# Patient Record
Sex: FEMALE | Race: WHITE | NOT HISPANIC OR LATINO | Employment: UNEMPLOYED | URBAN - METROPOLITAN AREA
[De-identification: names, ages, dates, MRNs, and addresses within clinical notes are randomized per-mention and may not be internally consistent; named-entity substitution may affect disease eponyms.]

---

## 2020-07-21 ENCOUNTER — OFFICE VISIT (OUTPATIENT)
Dept: OBSTETRICS AND GYNECOLOGY | Facility: CLINIC | Age: 35
End: 2020-07-21
Payer: MEDICAID

## 2020-07-21 VITALS
HEIGHT: 65 IN | BODY MASS INDEX: 48.82 KG/M2 | SYSTOLIC BLOOD PRESSURE: 137 MMHG | DIASTOLIC BLOOD PRESSURE: 82 MMHG | WEIGHT: 293 LBS | HEART RATE: 93 BPM

## 2020-07-21 DIAGNOSIS — E66.01 MORBID OBESITY: ICD-10-CM

## 2020-07-21 DIAGNOSIS — Z01.419 ENCOUNTER FOR GYNECOLOGICAL EXAMINATION WITHOUT ABNORMAL FINDING: Primary | ICD-10-CM

## 2020-07-21 DIAGNOSIS — N92.1 MENOMETRORRHAGIA: ICD-10-CM

## 2020-07-21 PROCEDURE — 99385 PREV VISIT NEW AGE 18-39: CPT | Mod: S$GLB,,, | Performed by: OBSTETRICS & GYNECOLOGY

## 2020-07-21 PROCEDURE — 99385 PR PREVENTIVE VISIT,NEW,18-39: ICD-10-PCS | Mod: S$GLB,,, | Performed by: OBSTETRICS & GYNECOLOGY

## 2020-07-21 RX ORDER — NORGESTIMATE AND ETHINYL ESTRADIOL 0.25-0.035
1 KIT ORAL DAILY
Qty: 90 TABLET | Refills: 3 | Status: SHIPPED | OUTPATIENT
Start: 2020-07-21 | End: 2021-01-19

## 2020-07-21 RX ORDER — CETIRIZINE HYDROCHLORIDE 10 MG/1
TABLET ORAL
COMMUNITY

## 2020-07-21 RX ORDER — METOPROLOL TARTRATE 50 MG/1
TABLET ORAL
COMMUNITY

## 2020-07-21 RX ORDER — GABAPENTIN 100 MG/1
CAPSULE ORAL
COMMUNITY
Start: 2019-06-03 | End: 2020-12-21 | Stop reason: CLARIF

## 2020-07-21 NOTE — PROGRESS NOTES
"  Subjective:       Patient ID: Cleo Bro is a 34 y.o. female.    Chief Complaint:  Annual Exam      History of Present Illness  Pt here for gyn annual.  History and past labs reviewed with patient.    Complaints irreg vag bleeding.  No family history of breast cancer.  Patient is a PCP.  She has not been to a gynecologist in 7 years.  She has no other complaints today.      Review of Systems  Review of Systems   Constitutional: Negative for chills and fever.   Respiratory: Negative for shortness of breath.    Cardiovascular: Negative for chest pain.   Gastrointestinal: Negative for abdominal pain, blood in stool, constipation, diarrhea, nausea, vomiting and reflux.   Genitourinary: Negative for dysmenorrhea, dyspareunia, dysuria, hematuria, hot flashes, menorrhagia, menstrual problem, pelvic pain, vaginal bleeding, vaginal discharge, postcoital bleeding and vaginal dryness.   Musculoskeletal: Negative for arthralgias and joint swelling.   Integumentary:  Negative for rash, hair changes, breast mass, nipple discharge and breast skin changes.   Psychiatric/Behavioral: Negative for depression. The patient is not nervous/anxious.    Breast: Negative for asymmetry, lump, mass, nipple discharge and skin changes          Objective:     Vitals:    07/21/20 1016   BP: 137/82   Pulse: 93   Weight: (!) 155.1 kg (342 lb)   Height: 5' 5" (1.651 m)       Physical Exam:   Constitutional: She appears well-developed and well-nourished. No distress.    HENT:   Head: Normocephalic and atraumatic.    Eyes: Conjunctivae and EOM are normal.    Neck: No tracheal deviation present. No thyromegaly present.    Cardiovascular: Exam reveals no clubbing, no cyanosis and no edema.     Pulmonary/Chest: Effort normal. No respiratory distress.        Abdominal: Soft. She exhibits no distension and no mass. There is no abdominal tenderness. There is no rebound and no guarding. No hernia.     Genitourinary:    Vagina, uterus and rectum normal. "      Pelvic exam was performed with patient supine.   There is no rash, tenderness, lesion or injury on the right labia. There is no rash, tenderness, lesion or injury on the left labia. Cervix is normal. Right adnexum displays no mass, no tenderness and no fullness. Left adnexum displays no mass, no tenderness and no fullness.                Skin: She is not diaphoretic. No cyanosis. Nails show no clubbing.        breast exam wnl- no nipple dc, skin changes, masses or lymph nodes palpated bilaterally    Assessment:        1. Encounter for gynecological examination without abnormal finding    2. Morbid obesity               Plan:      Annual   Pap today   Risk assessment for inherited gyn cancer done   RTC  1 year     ocps

## 2020-12-14 ENCOUNTER — TELEPHONE (OUTPATIENT)
Dept: ORTHOPEDICS | Facility: CLINIC | Age: 35
End: 2020-12-14

## 2020-12-14 NOTE — TELEPHONE ENCOUNTER
----- Message from Nilay Gunn sent at 12/14/2020 10:56 AM CST -----  Pt is being referred for a wrist fracture. Deanna scanned the referral/records into media mgr within [employer]. Please review and contact pt for scheduling,thanks

## 2020-12-14 NOTE — TELEPHONE ENCOUNTER
12/14/20  2:15pm  Spoke w/ referring doctor's office.    Was told to disregard referral.  Was sent to wrong office.

## 2020-12-21 ENCOUNTER — HOSPITAL ENCOUNTER (EMERGENCY)
Facility: HOSPITAL | Age: 35
Discharge: HOME | End: 2020-12-21
Attending: EMERGENCY MEDICINE
Payer: MEDICAID

## 2020-12-21 VITALS
HEIGHT: 64 IN | TEMPERATURE: 98 F | DIASTOLIC BLOOD PRESSURE: 83 MMHG | BODY MASS INDEX: 50.02 KG/M2 | RESPIRATION RATE: 12 BRPM | WEIGHT: 293 LBS | SYSTOLIC BLOOD PRESSURE: 144 MMHG | OXYGEN SATURATION: 95 % | HEART RATE: 82 BPM

## 2020-12-21 DIAGNOSIS — S52.691A OTHER CLOSED FRACTURE OF DISTAL END OF RIGHT ULNA, INITIAL ENCOUNTER: ICD-10-CM

## 2020-12-21 DIAGNOSIS — S52.501A CLOSED FRACTURE OF DISTAL END OF RIGHT RADIUS, UNSPECIFIED FRACTURE MORPHOLOGY, INITIAL ENCOUNTER: ICD-10-CM
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
## 2020-12-21 DIAGNOSIS — Z48.02 VISIT FOR SUTURE REMOVAL: Primary | ICD-10-CM

## 2020-12-21 LAB
BUN SERPL-MCNC: 7 MG/DL (ref 6–30)
CHLORIDE SERPL-SCNC: 101 MMOL/L (ref 95–110)
CREAT SERPL-MCNC: 0.4 MG/DL (ref 0.5–1.4)
GLUCOSE SERPL-MCNC: 138 MG/DL (ref 70–110)
HCT VFR BLD CALC: 31 %PCV (ref 36–54)
POC IONIZED CALCIUM: 1.07 MMOL/L (ref 1.06–1.42)
POC TCO2 (MEASURED): 26 MMOL/L (ref 23–29)
POTASSIUM BLD-SCNC: 4.2 MMOL/L (ref 3.5–5.1)
SAMPLE: ABNORMAL
SODIUM BLD-SCNC: 137 MMOL/L (ref 136–145)

## 2020-12-21 PROCEDURE — 29125 APPL SHORT ARM SPLINT STATIC: CPT

## 2020-12-21 PROCEDURE — 29105 APPLICATION LONG ARM SPLINT: CPT | Mod: RT

## 2020-12-21 PROCEDURE — 99284 PR EMERGENCY DEPT VISIT,LEVEL IV: ICD-10-PCS | Mod: 25,,, | Performed by: PHYSICIAN ASSISTANT

## 2020-12-21 PROCEDURE — 99284 EMERGENCY DEPT VISIT MOD MDM: CPT | Mod: 25,,, | Performed by: PHYSICIAN ASSISTANT

## 2020-12-21 PROCEDURE — 99283 EMERGENCY DEPT VISIT LOW MDM: CPT | Mod: 25

## 2020-12-21 PROCEDURE — 29105 PR APPLY LONG ARM SPLINT: ICD-10-PCS | Mod: RT,,, | Performed by: PHYSICIAN ASSISTANT

## 2020-12-21 PROCEDURE — 29105 APPLICATION LONG ARM SPLINT: CPT | Mod: RT,,, | Performed by: PHYSICIAN ASSISTANT

## 2020-12-21 RX ORDER — CEPHALEXIN 500 MG/1
500 CAPSULE ORAL EVERY 6 HOURS
COMMUNITY

## 2020-12-21 RX ORDER — IBUPROFEN 600 MG/1
600 TABLET ORAL 3 TIMES DAILY
COMMUNITY

## 2020-12-21 RX ORDER — CYCLOBENZAPRINE HCL 10 MG
10 TABLET ORAL 3 TIMES DAILY PRN
COMMUNITY

## 2021-01-05 ENCOUNTER — OFFICE VISIT (OUTPATIENT)
Dept: ORTHOPEDICS | Facility: CLINIC | Age: 36
End: 2021-01-05
Payer: MEDICAID

## 2021-01-05 VITALS
RESPIRATION RATE: 18 BRPM | SYSTOLIC BLOOD PRESSURE: 138 MMHG | DIASTOLIC BLOOD PRESSURE: 70 MMHG | BODY MASS INDEX: 57.85 KG/M2 | WEIGHT: 293 LBS | HEART RATE: 82 BPM

## 2021-01-05 DIAGNOSIS — S52.601A CLOSED FRACTURE OF DISTAL ENDS OF RIGHT RADIUS AND ULNA, INITIAL ENCOUNTER: Primary | ICD-10-CM

## 2021-01-05 DIAGNOSIS — S52.501A CLOSED FRACTURE OF DISTAL ENDS OF RIGHT RADIUS AND ULNA, INITIAL ENCOUNTER: Primary | ICD-10-CM

## 2021-01-05 DIAGNOSIS — M25.532 LEFT WRIST PAIN: Primary | ICD-10-CM

## 2021-01-05 DIAGNOSIS — M25.531 RIGHT WRIST PAIN: Primary | ICD-10-CM

## 2021-01-05 PROCEDURE — 99213 OFFICE O/P EST LOW 20 MIN: CPT | Mod: PBBFAC,PN | Performed by: PHYSICIAN ASSISTANT

## 2021-01-05 PROCEDURE — 99203 PR OFFICE/OUTPT VISIT, NEW, LEVL III, 30-44 MIN: ICD-10-PCS | Mod: S$PBB,,, | Performed by: PHYSICIAN ASSISTANT

## 2021-01-05 PROCEDURE — 99203 OFFICE O/P NEW LOW 30 MIN: CPT | Mod: S$PBB,,, | Performed by: PHYSICIAN ASSISTANT

## 2021-01-05 PROCEDURE — 99999 PR PBB SHADOW E&M-EST. PATIENT-LVL III: ICD-10-PCS | Mod: PBBFAC,,, | Performed by: PHYSICIAN ASSISTANT

## 2021-01-05 PROCEDURE — 99999 PR PBB SHADOW E&M-EST. PATIENT-LVL III: CPT | Mod: PBBFAC,,, | Performed by: PHYSICIAN ASSISTANT

## 2021-01-05 RX ORDER — OMEPRAZOLE 20 MG/1
40 CAPSULE, DELAYED RELEASE ORAL DAILY
COMMUNITY

## 2021-01-19 ENCOUNTER — OFFICE VISIT (OUTPATIENT)
Dept: ORTHOPEDICS | Facility: CLINIC | Age: 36
End: 2021-01-19
Payer: MEDICAID

## 2021-01-19 VITALS — BODY MASS INDEX: 56.13 KG/M2 | RESPIRATION RATE: 18 BRPM | WEIGHT: 293 LBS

## 2021-01-19 DIAGNOSIS — S52.501D CLOSED FRACTURE OF DISTAL ENDS OF RIGHT RADIUS AND ULNA WITH ROUTINE HEALING, SUBSEQUENT ENCOUNTER: Primary | ICD-10-CM

## 2021-01-19 DIAGNOSIS — S52.601D CLOSED FRACTURE OF DISTAL ENDS OF RIGHT RADIUS AND ULNA WITH ROUTINE HEALING, SUBSEQUENT ENCOUNTER: Primary | ICD-10-CM

## 2021-01-19 PROCEDURE — 99999 PR PBB SHADOW E&M-EST. PATIENT-LVL III: ICD-10-PCS | Mod: PBBFAC,,, | Performed by: PHYSICIAN ASSISTANT

## 2021-01-19 PROCEDURE — 99999 PR PBB SHADOW E&M-EST. PATIENT-LVL III: CPT | Mod: PBBFAC,,, | Performed by: PHYSICIAN ASSISTANT

## 2021-01-19 PROCEDURE — 99213 OFFICE O/P EST LOW 20 MIN: CPT | Mod: PBBFAC,PN | Performed by: PHYSICIAN ASSISTANT

## 2021-01-19 PROCEDURE — 99213 OFFICE O/P EST LOW 20 MIN: CPT | Mod: S$PBB,,, | Performed by: PHYSICIAN ASSISTANT

## 2021-01-19 PROCEDURE — 99213 PR OFFICE/OUTPT VISIT, EST, LEVL III, 20-29 MIN: ICD-10-PCS | Mod: S$PBB,,, | Performed by: PHYSICIAN ASSISTANT

## 2021-01-27 ENCOUNTER — CLINICAL SUPPORT (OUTPATIENT)
Dept: REHABILITATION | Facility: HOSPITAL | Age: 36
End: 2021-01-27
Payer: MEDICAID

## 2021-01-27 DIAGNOSIS — S52.501D CLOSED FRACTURE OF DISTAL ENDS OF RIGHT RADIUS AND ULNA WITH ROUTINE HEALING, SUBSEQUENT ENCOUNTER: ICD-10-CM

## 2021-01-27 DIAGNOSIS — R29.898 DECREASED GRIP STRENGTH OF RIGHT HAND: ICD-10-CM

## 2021-01-27 DIAGNOSIS — M79.89 SWELLING OF RIGHT HAND: ICD-10-CM

## 2021-01-27 DIAGNOSIS — S52.601D CLOSED FRACTURE OF DISTAL ENDS OF RIGHT RADIUS AND ULNA WITH ROUTINE HEALING, SUBSEQUENT ENCOUNTER: ICD-10-CM

## 2021-01-27 DIAGNOSIS — M25.60 RANGE OF MOTION DEFICIT: ICD-10-CM

## 2021-01-27 PROCEDURE — 97165 OT EVAL LOW COMPLEX 30 MIN: CPT

## 2021-02-09 ENCOUNTER — DOCUMENTATION ONLY (OUTPATIENT)
Dept: REHABILITATION | Facility: HOSPITAL | Age: 36
End: 2021-02-09

## 2021-02-17 ENCOUNTER — CLINICAL SUPPORT (OUTPATIENT)
Dept: REHABILITATION | Facility: HOSPITAL | Age: 36
End: 2021-02-17
Payer: MEDICAID

## 2021-02-17 DIAGNOSIS — R29.898 DECREASED GRIP STRENGTH OF RIGHT HAND: ICD-10-CM

## 2021-02-17 DIAGNOSIS — M79.89 SWELLING OF RIGHT HAND: ICD-10-CM

## 2021-02-17 DIAGNOSIS — M25.60 RANGE OF MOTION DEFICIT: ICD-10-CM

## 2021-02-17 PROCEDURE — 97530 THERAPEUTIC ACTIVITIES: CPT

## 2021-02-23 ENCOUNTER — CLINICAL SUPPORT (OUTPATIENT)
Dept: REHABILITATION | Facility: HOSPITAL | Age: 36
End: 2021-02-23
Payer: MEDICAID

## 2021-02-23 DIAGNOSIS — M25.60 RANGE OF MOTION DEFICIT: ICD-10-CM

## 2021-02-23 DIAGNOSIS — R29.898 DECREASED GRIP STRENGTH OF RIGHT HAND: ICD-10-CM

## 2021-02-23 DIAGNOSIS — M79.89 SWELLING OF RIGHT HAND: ICD-10-CM

## 2021-02-23 PROCEDURE — 97530 THERAPEUTIC ACTIVITIES: CPT

## 2021-03-02 ENCOUNTER — CLINICAL SUPPORT (OUTPATIENT)
Dept: REHABILITATION | Facility: HOSPITAL | Age: 36
End: 2021-03-02
Payer: MEDICAID

## 2021-03-02 DIAGNOSIS — M25.60 RANGE OF MOTION DEFICIT: ICD-10-CM

## 2021-03-02 DIAGNOSIS — R29.898 DECREASED GRIP STRENGTH OF RIGHT HAND: ICD-10-CM

## 2021-03-02 DIAGNOSIS — M79.89 SWELLING OF RIGHT HAND: ICD-10-CM

## 2021-03-02 PROCEDURE — 97530 THERAPEUTIC ACTIVITIES: CPT

## 2021-03-05 ENCOUNTER — OFFICE VISIT (OUTPATIENT)
Dept: ORTHOPEDICS | Facility: CLINIC | Age: 36
End: 2021-03-05
Payer: MEDICAID

## 2021-03-05 VITALS
BODY MASS INDEX: 54.93 KG/M2 | WEIGHT: 293 LBS | RESPIRATION RATE: 20 BRPM | DIASTOLIC BLOOD PRESSURE: 76 MMHG | SYSTOLIC BLOOD PRESSURE: 118 MMHG | HEART RATE: 80 BPM

## 2021-03-05 DIAGNOSIS — S52.601D CLOSED FRACTURE OF DISTAL ENDS OF RIGHT RADIUS AND ULNA WITH ROUTINE HEALING, SUBSEQUENT ENCOUNTER: Primary | ICD-10-CM

## 2021-03-05 DIAGNOSIS — S52.501D CLOSED FRACTURE OF DISTAL ENDS OF RIGHT RADIUS AND ULNA WITH ROUTINE HEALING, SUBSEQUENT ENCOUNTER: Primary | ICD-10-CM

## 2021-03-05 PROCEDURE — 99999 PR PBB SHADOW E&M-EST. PATIENT-LVL III: CPT | Mod: PBBFAC,,, | Performed by: PHYSICIAN ASSISTANT

## 2021-03-05 PROCEDURE — 99213 OFFICE O/P EST LOW 20 MIN: CPT | Mod: S$PBB,,, | Performed by: PHYSICIAN ASSISTANT

## 2021-03-05 PROCEDURE — 99213 OFFICE O/P EST LOW 20 MIN: CPT | Mod: PBBFAC,PN | Performed by: PHYSICIAN ASSISTANT

## 2021-03-05 PROCEDURE — 99999 PR PBB SHADOW E&M-EST. PATIENT-LVL III: ICD-10-PCS | Mod: PBBFAC,,, | Performed by: PHYSICIAN ASSISTANT

## 2021-03-05 PROCEDURE — 99213 PR OFFICE/OUTPT VISIT, EST, LEVL III, 20-29 MIN: ICD-10-PCS | Mod: S$PBB,,, | Performed by: PHYSICIAN ASSISTANT

## 2021-03-16 ENCOUNTER — CLINICAL SUPPORT (OUTPATIENT)
Dept: REHABILITATION | Facility: HOSPITAL | Age: 36
End: 2021-03-16
Payer: MEDICAID

## 2021-03-16 DIAGNOSIS — M25.60 RANGE OF MOTION DEFICIT: ICD-10-CM

## 2021-03-16 DIAGNOSIS — M79.89 SWELLING OF RIGHT HAND: ICD-10-CM

## 2021-03-16 DIAGNOSIS — R29.898 DECREASED GRIP STRENGTH OF RIGHT HAND: ICD-10-CM

## 2021-03-16 PROCEDURE — 97530 THERAPEUTIC ACTIVITIES: CPT

## 2023-06-16 ENCOUNTER — PATIENT MESSAGE (OUTPATIENT)
Dept: PODIATRY | Facility: CLINIC | Age: 38
End: 2023-06-16
Payer: MEDICAID